# Patient Record
Sex: MALE | Race: WHITE | NOT HISPANIC OR LATINO
[De-identification: names, ages, dates, MRNs, and addresses within clinical notes are randomized per-mention and may not be internally consistent; named-entity substitution may affect disease eponyms.]

---

## 2017-01-03 ENCOUNTER — RX RENEWAL (OUTPATIENT)
Age: 72
End: 2017-01-03

## 2017-03-07 ENCOUNTER — RX RENEWAL (OUTPATIENT)
Age: 72
End: 2017-03-07

## 2017-03-13 ENCOUNTER — RX RENEWAL (OUTPATIENT)
Age: 72
End: 2017-03-13

## 2017-03-20 ENCOUNTER — RX RENEWAL (OUTPATIENT)
Age: 72
End: 2017-03-20

## 2017-03-23 ENCOUNTER — RX RENEWAL (OUTPATIENT)
Age: 72
End: 2017-03-23

## 2017-05-04 ENCOUNTER — RX RENEWAL (OUTPATIENT)
Age: 72
End: 2017-05-04

## 2017-06-18 ENCOUNTER — RX RENEWAL (OUTPATIENT)
Age: 72
End: 2017-06-18

## 2017-06-19 ENCOUNTER — RX RENEWAL (OUTPATIENT)
Age: 72
End: 2017-06-19

## 2017-06-20 ENCOUNTER — RX RENEWAL (OUTPATIENT)
Age: 72
End: 2017-06-20

## 2017-06-21 ENCOUNTER — RX RENEWAL (OUTPATIENT)
Age: 72
End: 2017-06-21

## 2018-01-05 ENCOUNTER — RX RENEWAL (OUTPATIENT)
Age: 73
End: 2018-01-05

## 2020-02-12 ENCOUNTER — APPOINTMENT (OUTPATIENT)
Dept: ENDOCRINOLOGY | Facility: CLINIC | Age: 75
End: 2020-02-12
Payer: MEDICARE

## 2020-02-12 ENCOUNTER — RESULT CHARGE (OUTPATIENT)
Age: 75
End: 2020-02-12

## 2020-02-12 VITALS
HEIGHT: 73 IN | WEIGHT: 214 LBS | SYSTOLIC BLOOD PRESSURE: 156 MMHG | BODY MASS INDEX: 28.36 KG/M2 | DIASTOLIC BLOOD PRESSURE: 74 MMHG | HEART RATE: 74 BPM

## 2020-02-12 LAB — GLUCOSE BLDC GLUCOMTR-MCNC: 289

## 2020-02-12 PROCEDURE — 82962 GLUCOSE BLOOD TEST: CPT

## 2020-02-12 PROCEDURE — 99204 OFFICE O/P NEW MOD 45 MIN: CPT | Mod: 25

## 2020-02-12 RX ORDER — ASPIRIN 81 MG
81 TABLET, DELAYED RELEASE (ENTERIC COATED) ORAL DAILY
Refills: 0 | Status: ACTIVE | COMMUNITY

## 2020-02-12 RX ORDER — HYDROCHLOROTHIAZIDE 12.5 MG/1
12.5 CAPSULE ORAL
Qty: 180 | Refills: 1 | Status: DISCONTINUED | COMMUNITY
Start: 2017-03-13 | End: 2020-02-12

## 2020-02-27 NOTE — PHYSICAL EXAM
[Alert] : alert [No Acute Distress] : no acute distress [Well Nourished] : well nourished [Well Developed] : well developed [Normal Sclera/Conjunctiva] : normal sclera/conjunctiva [EOMI] : extra ocular movement intact [No Proptosis] : no proptosis [No Thyroid Nodules] : there were no palpable thyroid nodules [No Respiratory Distress] : no respiratory distress [Thyroid Not Enlarged] : the thyroid was not enlarged [Normal Rate] : heart rate was normal  [Clear to Auscultation] : lungs were clear to auscultation bilaterally [No Accessory Muscle Use] : no accessory muscle use [Pedal Pulses Normal] : the pedal pulses are present [Normal S1, S2] : normal S1 and S2 [Regular Rhythm] : with a regular rhythm [No Edema] : there was no peripheral edema [Post Cervical Nodes] : posterior cervical nodes [Anterior Cervical Nodes] : anterior cervical nodes [Axillary Nodes] : axillary nodes [Spine Straight] : spine straight [No Spinal Tenderness] : no spinal tenderness [Normal Strength/Tone] : muscle strength and tone were normal [No Stigmata of Cushings Syndrome] : no stigmata of cushings syndrome [Normal Gait] : normal gait [No Rash] : no rash [Full ROM] : with full range of motion [Normal] : normal [Normal Reflexes] : deep tendon reflexes were 2+ and symmetric [No Tremors] : no tremors [Oriented x3] : oriented to person, place, and time [Acanthosis Nigricans] : no acanthosis nigricans [Diminished Throughout Both Feet] : normal tactile sensation with monofilament testing throughout both feet

## 2020-02-27 NOTE — REASON FOR VISIT
[Initial Eval - Existing Diagnosis] : an initial evaluation of an existing diagnosis [FreeTextEntry1] : T2DM SINCE 2005 haD TO SWITCH [facility] [employer]

## 2020-02-27 NOTE — ASSESSMENT
[FreeTextEntry1] : pt is a 73 yo male with long standing h/o T2DM\par  -  T2DM- uncontrolled \par will check BS in AM and 2 hr pp melas- camn vary times\par  diet is not too bad but today  PP  5 slice honey wehat toast  coffee \par can meet wit h RD CDE genral DM ed and reivew diet \par  cotn currnet RX \par never had h/o pnacreatitits  \par can starte activity - no retriciotns from caridology  \par check updated labs\par \par Fatigue- can check hormonal levles \par \par sees ophtalmology q 6 moths \par pod sees every 5 weeks \par iron ferritin

## 2020-02-27 NOTE — HISTORY OF PRESENT ILLNESS
[FreeTextEntry1] : Pt here for eval of T2DM \par since   - if not for meter- pt would l have had no clue had DM  used wife'smeter- BS found to be high \par never met with RD CDE \par \par Current Regimen :\par MFN 1000 mg bid \par jardiance 25 mg qd\par \par \par Meter Uses [place] touch ultra \par \par \par Checks  BS   1-2times per day \par \par \par Hypoglycemia :Never \par \par \par \par Diet  Never met with RD CDE \par \par breakfast WW bread coffee\par L skips \par dinner vegetables rice meat \par no snack no sweets \par  no sweet beverages \par \par \par Exercise- rarely \par \par \par \par Follow ups \par \par opthalmology goes 2x yearly - no retinopathy \par \par Cardiology sees caridology reglualary for HTN and high col \par \par Pulmonary \par \par Nephrology  never seen  no nephropathy \par \par Podiatry \par sees every 5 weeks  callouses ion feet \par \par Intercurrent illness/medical problems\par knee arthritis - had injeciton last week- not steroids \par \par PMH OA \par HTn \par High chol \par  T2DM \par \par \par PSH Hernia repair \par colsocnopy- remove pre-malignant polyp 2016 - did repeat end  - all ok \par \par FH  Father  PPM Mom  in an accident \par sibling and children healthy \par No DM in family \par \par SOc HX  born and raise in [place] \par no chem no XRT \par nonsmoker \par  rare Etoh

## 2020-02-27 NOTE — REVIEW OF SYSTEMS
[Fatigue] : fatigue [Negative] : Heme/Lymph [FreeTextEntry2] : fatigue  pt had low Testosterone in the past  [FreeTextEntry3] : has cataract

## 2020-06-30 LAB
HBA1C MFR BLD HPLC: 8.2
LDLC SERPL DIRECT ASSAY-MCNC: 84

## 2020-07-01 ENCOUNTER — APPOINTMENT (OUTPATIENT)
Dept: ENDOCRINOLOGY | Facility: CLINIC | Age: 75
End: 2020-07-01
Payer: MEDICARE

## 2020-07-01 PROCEDURE — 99441: CPT

## 2020-07-01 RX ORDER — COLD-HOT PACK
125 MCG EACH MISCELLANEOUS DAILY
Refills: 0 | Status: DISCONTINUED | COMMUNITY
End: 2020-07-01

## 2020-07-29 ENCOUNTER — NON-APPOINTMENT (OUTPATIENT)
Age: 75
End: 2020-07-29

## 2020-07-29 NOTE — HISTORY OF PRESENT ILLNESS
[FreeTextEntry1] : fPhone follow up T2DM \par start time 420 \par  end time 430 pm \par \par \par Current Regimen :\par MFN 1000 mg bid \par jardiance 25 mg qd\par \par \par Meter Uses [place] touch ultra \par \par \par Checks  BS   1-2times per day \par \par \par Hypoglycemia :Never \par \par \par \par Diet  Never met with RD CDE \par \par breakfast WW bread coffee\par L skips \par dinner vegetables rice meat \par no snack no sweets \par  no sweet beverages \par eatign more fruits and vegetables lately \par \par Exercise- rarely \par \par \par \par Follow ups \par \par opthalmology goes 2x yearly - no retinopathy \par \par Cardiology sees caridology reglualary for HTN and high col \par \par Pulmonary \par \par Nephrology  never seen  no nephropathy \par \par Podiatry \par sees every 5 weeks  callouses ion feet \par \par Intercurrent illness/medical problems\par  \par PMH OA \par HTn \par High chol \par  T2DM \par \par \par PSH Hernia repair \par colsocnopy- remove pre-malignant polyp 2016 - did repeat end  - all ok \par \par FH  Father  PPM Mom  in an accident \par sibling and children healthy \par No DM in family \par \par SOc HX  born and raise in [place] \par no chem no XRT \par nonsmoker \par  rare Etoh

## 2020-07-29 NOTE — ASSESSMENT
[FreeTextEntry1] : pt is a 75 yo male with long standing h/o T2DM\par  -  T2DM- seems better controlled \par will check BS in AM and 2 hr pp melas- can vary times\par  check updated labs \par \par Fatigue- can check hormonal levles   h/o lowtestosterone \par \par sees ophtalmology q 6 moths \par pod sees every 5 weeks \par  \par \par  pt would like to stay near [place]- will follow up with DR Manjarrez

## 2020-11-03 ENCOUNTER — APPOINTMENT (OUTPATIENT)
Dept: ENDOCRINOLOGY | Facility: CLINIC | Age: 75
End: 2020-11-03

## 2020-11-25 ENCOUNTER — APPOINTMENT (OUTPATIENT)
Dept: ENDOCRINOLOGY | Facility: CLINIC | Age: 75
End: 2020-11-25

## 2021-01-26 ENCOUNTER — RESULT CHARGE (OUTPATIENT)
Age: 76
End: 2021-01-26

## 2021-01-26 ENCOUNTER — APPOINTMENT (OUTPATIENT)
Dept: ENDOCRINOLOGY | Facility: CLINIC | Age: 76
End: 2021-01-26
Payer: MEDICARE

## 2021-01-26 VITALS
OXYGEN SATURATION: 95 % | SYSTOLIC BLOOD PRESSURE: 124 MMHG | BODY MASS INDEX: 29.03 KG/M2 | DIASTOLIC BLOOD PRESSURE: 64 MMHG | HEART RATE: 66 BPM | WEIGHT: 219 LBS | HEIGHT: 73 IN

## 2021-01-26 LAB — GLUCOSE BLDC GLUCOMTR-MCNC: 207

## 2021-01-26 PROCEDURE — 82962 GLUCOSE BLOOD TEST: CPT

## 2021-01-26 PROCEDURE — 99214 OFFICE O/P EST MOD 30 MIN: CPT | Mod: 25

## 2021-01-26 PROCEDURE — 99072 ADDL SUPL MATRL&STAF TM PHE: CPT

## 2021-01-26 RX ORDER — EMPAGLIFLOZIN 25 MG/1
25 TABLET, FILM COATED ORAL
Qty: 90 | Refills: 0 | Status: DISCONTINUED | COMMUNITY
Start: 1900-01-01 | End: 2021-01-26

## 2021-01-26 RX ORDER — ALFUZOSIN HYDROCHLORIDE 10 MG/1
10 TABLET, EXTENDED RELEASE ORAL
Qty: 90 | Refills: 0 | Status: ACTIVE | COMMUNITY
Start: 2020-12-24

## 2021-04-27 ENCOUNTER — APPOINTMENT (OUTPATIENT)
Dept: ENDOCRINOLOGY | Facility: CLINIC | Age: 76
End: 2021-04-27

## 2021-06-01 ENCOUNTER — RESULT CHARGE (OUTPATIENT)
Age: 76
End: 2021-06-01

## 2021-06-01 ENCOUNTER — APPOINTMENT (OUTPATIENT)
Dept: ENDOCRINOLOGY | Facility: CLINIC | Age: 76
End: 2021-06-01
Payer: MEDICARE

## 2021-06-01 VITALS
SYSTOLIC BLOOD PRESSURE: 126 MMHG | TEMPERATURE: 98.1 F | WEIGHT: 210 LBS | BODY MASS INDEX: 27.83 KG/M2 | DIASTOLIC BLOOD PRESSURE: 72 MMHG | OXYGEN SATURATION: 96 % | HEIGHT: 73 IN | HEART RATE: 60 BPM

## 2021-06-01 LAB
GLUCOSE BLDC GLUCOMTR-MCNC: 293
HBA1C MFR BLD HPLC: 8.5
LDLC SERPL DIRECT ASSAY-MCNC: 75
MICROALBUMIN/CREAT 24H UR-RTO: 81

## 2021-06-01 PROCEDURE — 99215 OFFICE O/P EST HI 40 MIN: CPT | Mod: 25

## 2021-06-01 PROCEDURE — 82962 GLUCOSE BLOOD TEST: CPT

## 2021-06-01 RX ORDER — TORSEMIDE 20 MG/1
20 TABLET ORAL
Qty: 30 | Refills: 0 | Status: ACTIVE | COMMUNITY
Start: 2021-05-14

## 2021-06-01 RX ORDER — PREDNISOLONE ACETATE 10 MG/ML
1 SUSPENSION/ DROPS OPHTHALMIC
Qty: 5 | Refills: 0 | Status: ACTIVE | COMMUNITY
Start: 2021-02-08

## 2021-06-02 RX ORDER — TESTOSTERONE CYPIONATE 100 MG/ML
100 INJECTION, SOLUTION INTRAMUSCULAR
Qty: 1 | Refills: 0 | Status: DISCONTINUED | COMMUNITY
Start: 2021-06-01 | End: 2021-06-02

## 2021-06-03 RX ORDER — FLUTICASONE PROPIONATE 0.5 MG/G
0.05 CREAM TOPICAL
Qty: 30 | Refills: 0 | Status: DISCONTINUED | COMMUNITY
Start: 2020-12-23 | End: 2021-06-03

## 2021-06-03 RX ORDER — TOBRAMYCIN AND DEXAMETHASONE 3; 1 MG/ML; MG/ML
0.3-0.1 SUSPENSION/ DROPS OPHTHALMIC
Qty: 5 | Refills: 0 | Status: DISCONTINUED | COMMUNITY
Start: 2020-12-21 | End: 2021-06-03

## 2021-06-03 RX ORDER — DULOXETINE HYDROCHLORIDE 60 MG/1
60 CAPSULE, DELAYED RELEASE PELLETS ORAL
Qty: 90 | Refills: 0 | Status: DISCONTINUED | COMMUNITY
Start: 2020-12-29 | End: 2021-06-03

## 2021-06-03 RX ORDER — CHOLECALCIFEROL (VITAMIN D3) 25 MCG
2500 TABLET,CHEWABLE ORAL
Refills: 0 | Status: ACTIVE | COMMUNITY

## 2021-06-03 RX ORDER — POTASSIUM CHLORIDE 1500 MG/1
20 TABLET, FILM COATED, EXTENDED RELEASE ORAL
Refills: 0 | Status: ACTIVE | COMMUNITY

## 2021-08-31 ENCOUNTER — APPOINTMENT (OUTPATIENT)
Dept: ENDOCRINOLOGY | Facility: CLINIC | Age: 76
End: 2021-08-31
Payer: MEDICARE

## 2021-08-31 ENCOUNTER — RESULT CHARGE (OUTPATIENT)
Age: 76
End: 2021-08-31

## 2021-08-31 VITALS
OXYGEN SATURATION: 93 % | BODY MASS INDEX: 30.75 KG/M2 | TEMPERATURE: 98.2 F | HEART RATE: 62 BPM | SYSTOLIC BLOOD PRESSURE: 114 MMHG | HEIGHT: 73 IN | DIASTOLIC BLOOD PRESSURE: 70 MMHG | WEIGHT: 232 LBS

## 2021-08-31 LAB — GLUCOSE BLDC GLUCOMTR-MCNC: 232

## 2021-08-31 PROCEDURE — 99215 OFFICE O/P EST HI 40 MIN: CPT | Mod: 25

## 2021-08-31 PROCEDURE — 82962 GLUCOSE BLOOD TEST: CPT

## 2021-08-31 RX ORDER — POTASSIUM CHLORIDE 1500 MG/1
20 TABLET, FILM COATED, EXTENDED RELEASE ORAL
Qty: 90 | Refills: 0 | Status: DISCONTINUED | COMMUNITY
Start: 2021-04-23 | End: 2021-08-31

## 2021-08-31 RX ORDER — TESTOSTERONE 4 MG/D
4 PATCH TRANSDERMAL DAILY
Qty: 30 | Refills: 0 | Status: DISCONTINUED | COMMUNITY
Start: 2021-06-02 | End: 2021-08-31

## 2021-08-31 RX ORDER — NEOMYCIN SULFATE, POLYMYXIN B SULFATE AND DEXAMETHASONE 3.5; 10000; 1 MG/ML; [USP'U]/ML; MG/ML
3.5-10000-0.1 SUSPENSION OPHTHALMIC
Qty: 5 | Refills: 0 | Status: DISCONTINUED | COMMUNITY
Start: 2021-02-10 | End: 2021-08-31

## 2021-09-07 ENCOUNTER — RX RENEWAL (OUTPATIENT)
Age: 76
End: 2021-09-07

## 2021-10-12 LAB
HBA1C MFR BLD HPLC: 8.1
LDLC SERPL DIRECT ASSAY-MCNC: 80
MICROALBUMIN/CREAT 24H UR-RTO: 67

## 2021-10-13 ENCOUNTER — APPOINTMENT (OUTPATIENT)
Dept: ENDOCRINOLOGY | Facility: CLINIC | Age: 76
End: 2021-10-13
Payer: MEDICARE

## 2021-10-13 ENCOUNTER — RESULT CHARGE (OUTPATIENT)
Age: 76
End: 2021-10-13

## 2021-10-13 VITALS
DIASTOLIC BLOOD PRESSURE: 70 MMHG | HEIGHT: 73 IN | TEMPERATURE: 98.2 F | OXYGEN SATURATION: 96 % | WEIGHT: 230 LBS | BODY MASS INDEX: 30.48 KG/M2 | SYSTOLIC BLOOD PRESSURE: 120 MMHG | HEART RATE: 63 BPM

## 2021-10-13 LAB — GLUCOSE BLDC GLUCOMTR-MCNC: 337

## 2021-10-13 PROCEDURE — 99215 OFFICE O/P EST HI 40 MIN: CPT | Mod: 25

## 2021-10-13 PROCEDURE — 82962 GLUCOSE BLOOD TEST: CPT

## 2021-10-13 RX ORDER — NICOTINE 21 MG/24HR
21 PATCH, TRANSDERMAL 24 HOURS TRANSDERMAL
Qty: 28 | Refills: 0 | Status: DISCONTINUED | COMMUNITY
Start: 2021-05-19 | End: 2021-10-13

## 2021-10-13 RX ORDER — TAMSULOSIN HYDROCHLORIDE 0.4 MG/1
0.4 CAPSULE ORAL
Qty: 30 | Refills: 0 | Status: DISCONTINUED | COMMUNITY
Start: 2021-04-21 | End: 2021-10-13

## 2021-10-13 RX ORDER — DULAGLUTIDE 0.75 MG/.5ML
0.75 INJECTION, SOLUTION SUBCUTANEOUS
Qty: 6 | Refills: 0 | Status: DISCONTINUED | COMMUNITY
Start: 2021-01-26 | End: 2021-10-13

## 2021-10-25 ENCOUNTER — RX RENEWAL (OUTPATIENT)
Age: 76
End: 2021-10-25

## 2021-10-26 ENCOUNTER — RX RENEWAL (OUTPATIENT)
Age: 76
End: 2021-10-26

## 2021-11-03 ENCOUNTER — APPOINTMENT (OUTPATIENT)
Dept: ENDOCRINOLOGY | Facility: CLINIC | Age: 76
End: 2021-11-03

## 2021-11-14 ENCOUNTER — NON-APPOINTMENT (OUTPATIENT)
Age: 76
End: 2021-11-14

## 2021-11-19 ENCOUNTER — APPOINTMENT (OUTPATIENT)
Dept: ENDOCRINOLOGY | Facility: CLINIC | Age: 76
End: 2021-11-19
Payer: MEDICARE

## 2021-11-19 PROCEDURE — G0108 DIAB MANAGE TRN  PER INDIV: CPT

## 2021-12-02 ENCOUNTER — NON-APPOINTMENT (OUTPATIENT)
Age: 76
End: 2021-12-02

## 2022-01-09 DIAGNOSIS — I50.9 HEART FAILURE, UNSPECIFIED: ICD-10-CM

## 2022-01-12 ENCOUNTER — APPOINTMENT (OUTPATIENT)
Dept: ENDOCRINOLOGY | Facility: CLINIC | Age: 77
End: 2022-01-12
Payer: MEDICARE

## 2022-01-12 ENCOUNTER — RESULT CHARGE (OUTPATIENT)
Age: 77
End: 2022-01-12

## 2022-01-12 VITALS
SYSTOLIC BLOOD PRESSURE: 138 MMHG | WEIGHT: 227 LBS | DIASTOLIC BLOOD PRESSURE: 80 MMHG | HEART RATE: 81 BPM | HEIGHT: 73 IN | BODY MASS INDEX: 30.09 KG/M2

## 2022-01-12 LAB — GLUCOSE BLDC GLUCOMTR-MCNC: 198

## 2022-01-12 PROCEDURE — 99214 OFFICE O/P EST MOD 30 MIN: CPT | Mod: 25

## 2022-01-12 PROCEDURE — 82962 GLUCOSE BLOOD TEST: CPT

## 2022-01-19 ENCOUNTER — NON-APPOINTMENT (OUTPATIENT)
Age: 77
End: 2022-01-19

## 2022-01-20 PROBLEM — I50.9 CHF (CONGESTIVE HEART FAILURE): Status: ACTIVE | Noted: 2022-01-20

## 2022-03-18 ENCOUNTER — NON-APPOINTMENT (OUTPATIENT)
Age: 77
End: 2022-03-18

## 2022-04-13 ENCOUNTER — RESULT CHARGE (OUTPATIENT)
Age: 77
End: 2022-04-13

## 2022-04-13 ENCOUNTER — APPOINTMENT (OUTPATIENT)
Dept: ENDOCRINOLOGY | Facility: CLINIC | Age: 77
End: 2022-04-13
Payer: MEDICARE

## 2022-04-13 VITALS
BODY MASS INDEX: 31.28 KG/M2 | DIASTOLIC BLOOD PRESSURE: 70 MMHG | HEART RATE: 87 BPM | HEIGHT: 73 IN | WEIGHT: 236 LBS | SYSTOLIC BLOOD PRESSURE: 112 MMHG

## 2022-04-13 LAB — GLUCOSE BLDC GLUCOMTR-MCNC: 134

## 2022-04-13 PROCEDURE — 82962 GLUCOSE BLOOD TEST: CPT

## 2022-04-13 PROCEDURE — 99215 OFFICE O/P EST HI 40 MIN: CPT | Mod: 25

## 2022-04-13 RX ORDER — METFORMIN HYDROCHLORIDE 500 MG/1
500 TABLET, FILM COATED, EXTENDED RELEASE ORAL
Qty: 360 | Refills: 1 | Status: ACTIVE | COMMUNITY

## 2022-04-13 RX ORDER — BUPROPION HYDROCHLORIDE 150 MG/1
150 TABLET, EXTENDED RELEASE ORAL DAILY
Qty: 90 | Refills: 0 | Status: ACTIVE | COMMUNITY

## 2022-04-18 RX ORDER — SYRINGE WITH NEEDLE, 1 ML 25GX5/8"
21G X 1" SYRINGE, EMPTY DISPOSABLE MISCELLANEOUS
Qty: 1 | Refills: 1 | Status: DISCONTINUED | COMMUNITY
Start: 2022-04-13 | End: 2022-04-18

## 2022-04-18 RX ORDER — NEEDLES, SAFETY 22GX1 1/2"
23G X 1" NEEDLE, DISPOSABLE MISCELLANEOUS
Qty: 12 | Refills: 0 | Status: DISCONTINUED | COMMUNITY
Start: 2022-04-13 | End: 2022-04-18

## 2022-04-18 RX ORDER — TESTOSTERONE CYPIONATE 200 MG/ML
200 INJECTION, SOLUTION INTRAMUSCULAR
Qty: 6 | Refills: 0 | Status: DISCONTINUED | COMMUNITY
Start: 2022-04-13 | End: 2022-04-18

## 2022-04-21 ENCOUNTER — NON-APPOINTMENT (OUTPATIENT)
Age: 77
End: 2022-04-21

## 2022-05-13 ENCOUNTER — APPOINTMENT (OUTPATIENT)
Dept: ENDOCRINOLOGY | Facility: CLINIC | Age: 77
End: 2022-05-13

## 2022-05-24 ENCOUNTER — APPOINTMENT (OUTPATIENT)
Dept: ORTHOPEDIC SURGERY | Facility: CLINIC | Age: 77
End: 2022-05-24
Payer: MEDICARE

## 2022-05-24 VITALS — HEIGHT: 73 IN | BODY MASS INDEX: 31.28 KG/M2 | WEIGHT: 236 LBS

## 2022-05-24 DIAGNOSIS — M79.646 PAIN IN UNSPECIFIED FINGER(S): ICD-10-CM

## 2022-05-24 PROCEDURE — 20610 DRAIN/INJ JOINT/BURSA W/O US: CPT

## 2022-05-24 PROCEDURE — 99214 OFFICE O/P EST MOD 30 MIN: CPT | Mod: 25

## 2022-05-24 PROCEDURE — 20600 DRAIN/INJ JOINT/BURSA W/O US: CPT

## 2022-05-24 NOTE — HISTORY OF PRESENT ILLNESS
[de-identified] : 76M presents with left shoulder pain with overhead activity and right thumb MP pain with no clicking or locking, No numbness/tingling. No trauma.\par \par 5/24/22: f/u left shoulder, right thumb pain. Reports CSI helped for only about 1-2 months. Denies any numbness/tingling. Would like another CSI today.

## 2022-05-24 NOTE — IMAGING
[de-identified] : Left shoulder with no swelling nor erythema. FF to 110 and abduction to 110 painful at midarc. ER to 35, IR to lower lumbar. Able to make fist, oppose thumb to small finger and abduct fingers. +impingement. Sensation intact throughout. <2sec cap refill.\par \par Left shoulder radiographs with no fracture nor dislocation. Minimal arthrosis. \par \par \par Right Hand: Inspection of the hand/wrist is as follows: Right wrist with no swelling nor erythema. +ttp at thumb MP, no catching with flexion/extension. Able to make fist, oppose thumb to small finger and abduct fingers. Sensation intact throughout. <2sec cap refill.\par \par Right wrist radiographs with thumb MP arthritis/sesamoid arthritis, no fracture nor dislocation.

## 2022-05-24 NOTE — ASSESSMENT
[FreeTextEntry1] : Left shoulder subacromial bursitis - reviewed radiographs and pathoanatpomy with patient. Discussed management will include NSAIDs prn, CSI and PT. After discussion of risks/benefits to CSI, patient elected to proceed.\par ***Procedure 1 - 5.0cc 1%lidocaine + 1.0cc 40mg/cc Kenalog administered to left subacromial space following sterilization with Betadine. Patient tolerated this well.\par \par Right thumb MP arthritis - reviewed radiographs and pathoanatpomy with patient. Discussed management will include NSAIDs prn, bracing, CSI and OT. After discussion of risks/benefits to CSI, patient elected to proceed.\par ***Procedure 1 - 0.5cc 1%lidocaine + 0.5cc 40mg/cc Kenalog administered to right thumb MP/sesamoid following sterilization with Betadine. Patient tolerated this well.\par \par F/u prn

## 2022-07-19 ENCOUNTER — APPOINTMENT (OUTPATIENT)
Dept: ENDOCRINOLOGY | Facility: CLINIC | Age: 77
End: 2022-07-19

## 2022-07-19 ENCOUNTER — RESULT CHARGE (OUTPATIENT)
Age: 77
End: 2022-07-19

## 2022-07-19 VITALS
BODY MASS INDEX: 30.62 KG/M2 | HEIGHT: 73 IN | WEIGHT: 231 LBS | DIASTOLIC BLOOD PRESSURE: 74 MMHG | SYSTOLIC BLOOD PRESSURE: 142 MMHG | OXYGEN SATURATION: 96 % | HEART RATE: 65 BPM

## 2022-07-19 LAB
GLUCOSE BLDC GLUCOMTR-MCNC: 220
HBA1C MFR BLD HPLC: 6.6
LDLC SERPL DIRECT ASSAY-MCNC: 76
MICROALBUMIN/CREAT 24H UR-RTO: 248

## 2022-07-19 PROCEDURE — 99214 OFFICE O/P EST MOD 30 MIN: CPT | Mod: 25

## 2022-07-19 PROCEDURE — 82962 GLUCOSE BLOOD TEST: CPT

## 2022-07-19 RX ORDER — METFORMIN ER 500 MG 500 MG/1
500 TABLET ORAL
Qty: 360 | Refills: 0 | Status: ACTIVE | COMMUNITY
Start: 2022-03-17

## 2022-07-19 RX ORDER — CARVEDILOL 25 MG/1
25 TABLET, FILM COATED ORAL
Qty: 90 | Refills: 0 | Status: DISCONTINUED | COMMUNITY
Start: 2021-05-16 | End: 2022-07-19

## 2022-07-19 RX ORDER — TESTOSTERONE 4 MG/D
4 PATCH TRANSDERMAL
Qty: 3 | Refills: 0 | Status: DISCONTINUED | COMMUNITY
Start: 2022-04-18 | End: 2022-07-19

## 2022-07-19 RX ORDER — CARVEDILOL 6.25 MG/1
6.25 TABLET, FILM COATED ORAL
Qty: 180 | Refills: 0 | Status: ACTIVE | COMMUNITY
Start: 2022-05-15

## 2022-07-19 RX ORDER — TADALAFIL 20 MG/1
20 TABLET ORAL
Qty: 18 | Refills: 0 | Status: ACTIVE | COMMUNITY
Start: 2021-09-20

## 2022-07-19 RX ORDER — CLOTRIMAZOLE AND BETAMETHASONE DIPROPIONATE 10; .5 MG/G; MG/G
1-0.05 CREAM TOPICAL
Qty: 45 | Refills: 0 | Status: COMPLETED | COMMUNITY
Start: 2022-01-19

## 2023-02-02 ENCOUNTER — APPOINTMENT (OUTPATIENT)
Dept: ENDOCRINOLOGY | Facility: CLINIC | Age: 78
End: 2023-02-02
Payer: MEDICARE

## 2023-02-02 VITALS
SYSTOLIC BLOOD PRESSURE: 144 MMHG | BODY MASS INDEX: 30.88 KG/M2 | OXYGEN SATURATION: 69 % | HEIGHT: 73 IN | DIASTOLIC BLOOD PRESSURE: 76 MMHG | WEIGHT: 233 LBS | HEART RATE: 95 BPM

## 2023-02-02 LAB — GLUCOSE BLDC GLUCOMTR-MCNC: 251

## 2023-02-02 PROCEDURE — 99214 OFFICE O/P EST MOD 30 MIN: CPT | Mod: 25

## 2023-02-02 PROCEDURE — 82962 GLUCOSE BLOOD TEST: CPT

## 2023-02-02 RX ORDER — BLOOD-GLUCOSE METER
W/DEVICE EACH MISCELLANEOUS
Qty: 1 | Refills: 0 | Status: DISCONTINUED | COMMUNITY
Start: 2021-10-14 | End: 2023-02-02

## 2023-02-02 RX ORDER — LANCETS
EACH MISCELLANEOUS
Qty: 2 | Refills: 1 | Status: DISCONTINUED | COMMUNITY
Start: 2021-11-19 | End: 2023-02-02

## 2023-02-02 RX ORDER — LANCETS
EACH MISCELLANEOUS
Qty: 2 | Refills: 0 | Status: ACTIVE | COMMUNITY
Start: 2023-02-02 | End: 1900-01-01

## 2023-02-02 RX ORDER — BLOOD SUGAR DIAGNOSTIC
STRIP MISCELLANEOUS
Qty: 100 | Refills: 1 | Status: ACTIVE | COMMUNITY
Start: 2023-02-02 | End: 1900-01-01

## 2023-02-02 RX ORDER — BLOOD SUGAR DIAGNOSTIC
STRIP MISCELLANEOUS
Qty: 2 | Refills: 1 | Status: DISCONTINUED | COMMUNITY
Start: 2021-10-13 | End: 2023-02-02

## 2023-02-02 RX ORDER — BLOOD-GLUCOSE METER
W/DEVICE EACH MISCELLANEOUS
Qty: 1 | Refills: 0 | Status: ACTIVE | COMMUNITY
Start: 2023-02-02 | End: 1900-01-01

## 2023-02-02 RX ORDER — LANCETS
EACH MISCELLANEOUS
Qty: 200 | Refills: 1 | Status: DISCONTINUED | COMMUNITY
Start: 2021-10-13 | End: 2023-02-02

## 2023-02-02 RX ORDER — BLOOD SUGAR DIAGNOSTIC
STRIP MISCELLANEOUS
Qty: 200 | Refills: 1 | Status: ACTIVE | COMMUNITY
Start: 2023-02-02 | End: 1900-01-01

## 2023-02-02 NOTE — REASON FOR VISIT
[Follow - Up] : a follow-up visit [DM Type 2] : DM Type 2 [Hypogonadism] : hypogonadism [Other: _____] : [unfilled]

## 2023-02-02 NOTE — ASSESSMENT
[Diabetes Foot Care] : diabetes foot care [Importance of Diet and Exercise] : importance of diet and exercise to improve glycemic control, achieve weight loss and improve cardiovascular health [Exercise/Effect on Glucose] : exercise/effect on glucose [Hypoglycemia Management] : hypoglycemia management [Self Monitoring of Blood Glucose] : self monitoring of blood glucose [Retinopathy Screening] : Patient was referred to ophthalmology for retinopathy screening [FreeTextEntry1] : 76M w/ T2DM cb chf, HLD, hypogonadism and HTN here for follow up.\par rtc in3 months with labs\par Awaiting results to be sent from PCP\par \par Fatigue, Hypogonadism- fatigued improved. testosterone improved on clomid. continue clomid 50mg every other day. at some point can consider dc.\par \par T2DM cb chf- improving. goal A1c<7 based on age. Likely due to dietary indiscretion and nature of disease. Check sugars at least 2x daily. \par Education provided on diet. Adding protein to bread. Stop eating just simple carbs by themselves\par up to date with eye dotor\par continue metformin ER 500mg 4 tabs daily (has had CHF since 2021, not decompensated)\par continue ozempic 0.25 weekly, cannot increase dose due to expense (lasts about 2 months), can consider patient assistance\par Increase lantus 26 units bedtime\par off jardiance bc too expensive\par off trulicity bc too expensive\par sees podiatrist regularly\par has systolic CHF w/ EF 35%, cannot add actos\par \par HLD- goal ldl<70, dm, chf and hld. on statin. seeing cardiologist, [place] next week \par \par HTN- BP acceptable. continue ARB\par . \par \par Pt to stop MFN 3 days before general anesthesia and inject 12 units of lantus the night before \par Will call patient when receive labs from PCP\par \par RTO in 3 months with Dr. MENDES\par

## 2023-02-02 NOTE — PHYSICAL EXAM
[Alert] : alert [Normal Sclera/Conjunctiva] : normal sclera/conjunctiva [Normal Hearing] : hearing was normal [No Neck Mass] : no neck mass was observed [Thyroid Not Enlarged] : the thyroid was not enlarged [No Respiratory Distress] : no respiratory distress [No Accessory Muscle Use] : no accessory muscle use [Clear to Auscultation] : lungs were clear to auscultation bilaterally [Normal S1, S2] : normal S1 and S2 [No Stigmata of Cushings Syndrome] : no stigmata of Cushings Syndrome [Normal Gait] : normal gait [Oriented x3] : oriented to person, place, and time

## 2023-02-02 NOTE — HISTORY OF PRESENT ILLNESS
[FreeTextEntry1] : goes by "Bill"\par here for t2dm cb chf, htn, hld\par \par interval history:\par awaiting for PCP to fax over recent labs. As per patient HgbA1C 7.5%\par has good energy, feels good\par here with wife, madina (sees Dr. Juarez)\par \par regimen:\par metformin ER 1000mg BID\par ozempic 0.25mg weekly- no issues, Wednesday nights \par lantus 24 units QHS\par off trulicity bc too expensive\par off jardiance bc too expensive\par \par FS in office: 251-> coffee and bread for breakfast \par FS checks daily, fasting \par FS ranges this past week has been more elevated 150-160, prior was 120-130s\par \par \par diet: diet has not been the greatest\par B-> whole wheat bread with I cant believe butter, 2 cups pof coffee with sweet n low \par L-> left overs\par D-> protein, salad, veggie, rice, pasta (Lower crab)\par Snack--> midnight snack sausage, cold cuts, pickles, mc donalds cheese burger, campbells chicken noodle soup \par Drink-> 4 cups of coffee, seltzer  \par exercise: joined [employer]\par \par eye doctor: 2022, [facility], goes once a year \par has systolic CHF. no hospitalizations since last visit \par sees podiatry for calluses on feet, has appointment today at 3PM\par \par On 2/16/23- having urolift to hold prostate open for better urine flow, will be done out patient in office under general anesthesia

## 2023-02-10 ENCOUNTER — NON-APPOINTMENT (OUTPATIENT)
Age: 78
End: 2023-02-10

## 2023-03-14 ENCOUNTER — APPOINTMENT (OUTPATIENT)
Dept: ORTHOPEDIC SURGERY | Facility: CLINIC | Age: 78
End: 2023-03-14
Payer: MEDICARE

## 2023-03-14 DIAGNOSIS — M65.30 TRIGGER FINGER, UNSPECIFIED FINGER: ICD-10-CM

## 2023-03-14 PROCEDURE — 20610 DRAIN/INJ JOINT/BURSA W/O US: CPT | Mod: LT

## 2023-03-14 PROCEDURE — 99214 OFFICE O/P EST MOD 30 MIN: CPT | Mod: 25

## 2023-03-14 PROCEDURE — 20550 NJX 1 TENDON SHEATH/LIGAMENT: CPT | Mod: RT,59

## 2023-03-14 NOTE — HISTORY OF PRESENT ILLNESS
[de-identified] : 76M presents with left shoulder pain with overhead activity and right thumb MP pain with no clicking or locking, No numbness/tingling. No trauma.\par \par 5/24/22: f/u left shoulder, right thumb pain. Reports CSI helped for only about 1-2 months. Denies any numbness/tingling. Would like another CSI today. \par \par 3/14/23: f/u left shoulder and right hand. Reports shoulder is giving off a lot of pain, difficulty sleeping and certain ROM. Denies numbness/tingling. He reports right hand index finer was triggering not long ago, but currently stopped. Would like to proceed with another CSI.

## 2023-03-14 NOTE — IMAGING
[de-identified] : Left shoulder with no swelling nor erythema. FF to 110 and abduction to 110 painful at midarc. ER to 35, IR to lower lumbar. Able to make fist, oppose thumb to small finger and abduct fingers. +impingement. Sensation intact throughout. <2sec cap refill.\par \par Left shoulder radiographs with no fracture nor dislocation. Minimal arthrosis. \par \par \par Right Hand: Inspection of the hand/wrist is as follows: Right wrist with no swelling nor erythema. +ttp at thumb MP, no catching with flexion/extension. Able to make fist, oppose thumb to small finger and abduct fingers. Sensation intact throughout. <2sec cap refill. +ttp at index finger A1 pulley with catching/locking.\par \par Right wrist radiographs with thumb MP arthritis/sesamoid arthritis, no fracture nor dislocation.

## 2023-03-14 NOTE — ASSESSMENT
[FreeTextEntry1] : Left shoulder subacromial bursitis - reviewed radiographs and pathoanatomy with patient. Discussed management will include NSAIDs prn, CSI and PT. After discussion of risks/benefits to CSI, patient elected to proceed.\par ***Procedure 1 - 5.0cc 1%lidocaine + 1.0cc 40mg/cc Kenalog administered to left subacromial space following sterilization with Betadine. Patient tolerated this well.\par \par Right thumb MP arthritis - reviewed radiographs and pathoanatomy with patient. Discussed management will include NSAIDs prn, bracing, CSI and OT. Previously injected, helped.\par \par Right index finger trigger - Discussed with patient the pathoanatomy of trigger and options going forward. Risks/benefits discussed as well as natural progression that injection will provide some relief but symptoms may recur. Discussed that pain may worsen in coming days but will subside. Discussed that we can repeat an injection or that operative intervention may be indicated down the line.\par ***Procedure 2 - 0.5cc 1%lidocaine + 0.5cc 40mg/cc Kenalog administered to right index finger A1 pulley following sterilization with Betadine. Patient tolerated this well.\par \par F/u prn

## 2023-05-24 RX ORDER — PEN NEEDLE, DIABETIC 29 G X1/2"
32G X 4 MM NEEDLE, DISPOSABLE MISCELLANEOUS
Qty: 1 | Refills: 0 | Status: ACTIVE | COMMUNITY
Start: 2021-11-19 | End: 1900-01-01

## 2023-06-06 ENCOUNTER — APPOINTMENT (OUTPATIENT)
Dept: ENDOCRINOLOGY | Facility: CLINIC | Age: 78
End: 2023-06-06

## 2023-07-12 ENCOUNTER — RESULT CHARGE (OUTPATIENT)
Age: 78
End: 2023-07-12

## 2023-07-13 ENCOUNTER — APPOINTMENT (OUTPATIENT)
Dept: ENDOCRINOLOGY | Facility: CLINIC | Age: 78
End: 2023-07-13
Payer: MEDICARE

## 2023-07-13 VITALS
HEART RATE: 64 BPM | OXYGEN SATURATION: 98 % | HEIGHT: 73 IN | DIASTOLIC BLOOD PRESSURE: 75 MMHG | BODY MASS INDEX: 29.95 KG/M2 | SYSTOLIC BLOOD PRESSURE: 140 MMHG | WEIGHT: 226 LBS

## 2023-07-13 LAB — GLUCOSE BLDC GLUCOMTR-MCNC: 203

## 2023-07-13 PROCEDURE — 82962 GLUCOSE BLOOD TEST: CPT

## 2023-07-13 PROCEDURE — 99214 OFFICE O/P EST MOD 30 MIN: CPT | Mod: 25

## 2023-07-13 NOTE — REASON FOR VISIT
[Follow - Up] : a follow-up visit [DM Type 2] : DM Type 2 [Hypogonadism] : hypogonadism [Spouse] : spouse

## 2023-07-13 NOTE — HISTORY OF PRESENT ILLNESS
[FreeTextEntry1] : goes by "Bill"\par here for t2dm cb chf, htn, hld\par \par interval history:\par awaiting for PCP to fax over recent labs. As per patient HgbA1C 7.5%\par has good energy, feels good\par here with wife, madina (sees Dr. Juarez)\par \par regimen:\par metformin ER 1000mg BID\par ozempic 0.25mg weekly- no issues, Wednesday nights \par lantus 26 units QHS\par off trulicity bc too expensive\par off jardiance bc too expensive\par \par FS in office:  203-->> ate around 12:30, 3 pieces of toast and coffee \par FS checks daily, fasting 120-150\par \par \par diet: diet has not been the greatest\par B-> whole wheat bread with I cant believe butter, 2 cups pof coffee with sweet n low \par L-> left overs\par D-> protein, salad, veggie, rice, pasta (Lower crab)\par Snack--> midnight snack sausage, cold cuts, pickles, mc donalds cheese burger, campbells chicken noodle soup \par Drink-> 4 cups of coffee, seltzer  \par exercise: joined [employer]\par \par eye doctor: 10/2022, [facility], goes once a year \par has systolic CHF. no hospitalizations since last visit \par sees podiatry for calluses on feet, every 5 weeks \par \par Urolift--> surgery went well, healing, fine went from urinating 6 times a night to 2 times  \par \par Pt c/o extreme fatigue, he stopped clomid for 6 weeks in May, did not feel any difference, restarted again 6/14/23.\par He also c/o red spots on arms (looks like small brusising) was told to f/u with PCP and cardiologist.

## 2023-07-13 NOTE — REVIEW OF SYSTEMS
[Fatigue] : fatigue [Recent Weight Loss (___ Lbs)] : recent weight loss: [unfilled] lbs [Visual Field Defect] : visual field defect [Blurred Vision] : no blurred vision [Dysphagia] : no dysphagia [Chest Pain] : no chest pain [Palpitations] : no palpitations [Shortness Of Breath] : no shortness of breath [Constipation] : no constipation [Vomiting] : no vomiting [Diarrhea] : no diarrhea [Polyuria] : no polyuria [Polydipsia] : no polydipsia

## 2023-07-13 NOTE — ASSESSMENT
[Diabetes Foot Care] : diabetes foot care [Long Term Vascular Complications] : long term vascular complications of diabetes [Carbohydrate Consistent Diet] : carbohydrate consistent diet [Exercise/Effect on Glucose] : exercise/effect on glucose [Retinopathy Screening] : Patient was referred to ophthalmology for retinopathy screening [FreeTextEntry1] : 77 M w/ T2DM cb chf, HLD, hypogonadism and HTN here for follow up.\par rtc in3 months with labs\par Awaiting results to be sent from PCP\par \par Fatigue, Hypogonadism- fatigued worse testosterone improved on clomid. continue clomid 50mg every other day. at some point can consider dc.awaiting testosterone levels \par \par T2DM cb chf- improving. goal A1c<7 based on age. Likely due to dietary indiscretion and nature of disease. Check sugars at least 2x daily. \par Education provided on diet. Adding protein to bread. Stop eating just simple carbs by themselves\par up to date with eye dotor\par continue metformin ER 500mg 4 tabs daily (has had CHF since 2021, not decompensated)\par Patient wants to stop ozempic for 2 weeks to see if constipation is better \par continue lantus 26 units bedtime\par off jardiance bc too expensive\par off trulicity bc too expensive\par sees podiatrist regularly\par has systolic CHF w/ EF 35%, cannot add actos\par \par HLD- goal ldl<70, dm, chf and hld. on statin. seeing cardiologist, [place] next week \par \par HTN- BP acceptable. continue ARB\par \par . \par Needs to see PCP/cardio for brusiing on arms, has cardio appt on Monday \par \par RTO in 3 months with Dr. MENDES\par \par Awaiting lab results \par

## 2023-07-17 ENCOUNTER — NON-APPOINTMENT (OUTPATIENT)
Age: 78
End: 2023-07-17

## 2023-07-18 ENCOUNTER — APPOINTMENT (OUTPATIENT)
Dept: ORTHOPEDIC SURGERY | Facility: CLINIC | Age: 78
End: 2023-07-18
Payer: MEDICARE

## 2023-07-18 DIAGNOSIS — M25.519 PAIN IN UNSPECIFIED SHOULDER: ICD-10-CM

## 2023-07-18 PROCEDURE — 20610 DRAIN/INJ JOINT/BURSA W/O US: CPT | Mod: LT

## 2023-07-18 PROCEDURE — 99214 OFFICE O/P EST MOD 30 MIN: CPT | Mod: 25

## 2023-07-18 PROCEDURE — 73564 X-RAY EXAM KNEE 4 OR MORE: CPT | Mod: LT

## 2023-07-18 NOTE — HISTORY OF PRESENT ILLNESS
[de-identified] : 76M presents with left shoulder pain with overhead activity and right thumb MP pain with no clicking or locking, No numbness/tingling. No trauma.\par \par 5/24/22: f/u left shoulder, right thumb pain. Reports CSI helped for only about 1-2 months. Denies any numbness/tingling. Would like another CSI today. \par \par 3/14/23: f/u left shoulder and right hand. Reports shoulder is giving off a lot of pain, difficulty sleeping and certain ROM. Denies numbness/tingling. He reports right hand index finer was triggering not long ago, but currently stopped. Would like to proceed with another CSI. \par \par ** NEW COMPLAINT**\par \par 7/18/23 Patient presents today with left knee pain over 5+ years, no specific cause of injury/ trauma. Admits to experiencing shooting pain, no prior treatment. Patient reports continues to experience shoulder pain. Denies numbness/ tingling. Denies recent OT/PT treatment.

## 2023-07-18 NOTE — IMAGING
[de-identified] : Left shoulder with no swelling nor erythema. FF to 110 and abduction to 110 painful at midarc. ER to 35, IR to lower lumbar. Able to make fist, oppose thumb to small finger and abduct fingers. +impingement. Sensation intact throughout. <2sec cap refill.\par \par Left shoulder radiographs with no fracture nor dislocation. Minimal arthrosis. \par \par \par Right Hand: Inspection of the hand/wrist is as follows: Right wrist with no swelling nor erythema. +ttp at thumb MP, no catching with flexion/extension. Able to make fist, oppose thumb to small finger and abduct fingers. Sensation intact throughout. <2sec cap refill. +ttp at index finger A1 pulley with catching/locking.\par \par Right wrist radiographs with thumb MP arthritis/sesamoid arthritis, no fracture nor dislocation.  \par \par Left knee with +ttp along biceps insertion. Mi,d effusion. Able to straight leg raise, +TA, EHL, GA. SILT throughout.\par \par Left knee radiographs with advanced medial compartment arthrosis.

## 2023-07-18 NOTE — ASSESSMENT
[FreeTextEntry1] : Left shoulder subacromial bursitis - reviewed radiographs and pathoanatomy with patient. Discussed management will include NSAIDs prn, CSI and PT. After discussion of risks/benefits to CSI, patient elected to proceed.\par ***Procedure 1 - 5.0cc 1%lidocaine + 1.0cc 40mg/cc Kenalog administered to left subacromial space following sterilization with Betadine. Patient tolerated this well.\par \par Right thumb MP arthritis - reviewed radiographs and pathoanatomy with patient. Discussed management will include NSAIDs prn, bracing, CSI and OT. Previously injected, helped.\par \par Right index finger trigger - Discussed with patient the pathoanatomy of trigger and options going forward. Risks/benefits discussed as well as natural progression that injection will provide some relief but symptoms may recur. Discussed that pain may worsen in coming days but will subside. Discussed that we can repeat an injection or that operative intervention may be indicated down the line.\par \par Left knee Biceps insertional tendonitis - reviewed radiographs and pathoanatomy. PT, NSAIDs prn\par \par F/u prn

## 2023-08-29 ENCOUNTER — RX RENEWAL (OUTPATIENT)
Age: 78
End: 2023-08-29

## 2023-09-14 ENCOUNTER — RX RENEWAL (OUTPATIENT)
Age: 78
End: 2023-09-14

## 2023-09-20 ENCOUNTER — RX RENEWAL (OUTPATIENT)
Age: 78
End: 2023-09-20

## 2023-10-18 LAB
HBA1C MFR BLD HPLC: 8.5
LDLC SERPL DIRECT ASSAY-MCNC: 75
MICROALBUMIN/CREAT 24H UR-RTO: 96

## 2023-10-19 ENCOUNTER — APPOINTMENT (OUTPATIENT)
Dept: ENDOCRINOLOGY | Facility: CLINIC | Age: 78
End: 2023-10-19
Payer: MEDICARE

## 2023-10-19 VITALS
HEIGHT: 73 IN | DIASTOLIC BLOOD PRESSURE: 70 MMHG | TEMPERATURE: 98 F | HEART RATE: 76 BPM | WEIGHT: 222 LBS | OXYGEN SATURATION: 98 % | BODY MASS INDEX: 29.42 KG/M2 | SYSTOLIC BLOOD PRESSURE: 135 MMHG

## 2023-10-19 DIAGNOSIS — R53.83 OTHER FATIGUE: ICD-10-CM

## 2023-10-19 LAB — GLUCOSE BLDC GLUCOMTR-MCNC: 425

## 2023-10-19 PROCEDURE — 82962 GLUCOSE BLOOD TEST: CPT

## 2023-10-19 PROCEDURE — 99214 OFFICE O/P EST MOD 30 MIN: CPT | Mod: 25

## 2023-10-30 ENCOUNTER — APPOINTMENT (OUTPATIENT)
Dept: ORTHOPEDIC SURGERY | Facility: CLINIC | Age: 78
End: 2023-10-30

## 2023-11-28 ENCOUNTER — NON-APPOINTMENT (OUTPATIENT)
Age: 78
End: 2023-11-28

## 2023-12-07 ENCOUNTER — APPOINTMENT (OUTPATIENT)
Dept: ENDOCRINOLOGY | Facility: CLINIC | Age: 78
End: 2023-12-07
Payer: MEDICARE

## 2023-12-07 VITALS
WEIGHT: 222 LBS | SYSTOLIC BLOOD PRESSURE: 146 MMHG | OXYGEN SATURATION: 98 % | HEART RATE: 65 BPM | HEIGHT: 73 IN | DIASTOLIC BLOOD PRESSURE: 82 MMHG | BODY MASS INDEX: 29.42 KG/M2

## 2023-12-07 LAB — GLUCOSE BLDC GLUCOMTR-MCNC: 315

## 2023-12-07 PROCEDURE — 82962 GLUCOSE BLOOD TEST: CPT

## 2023-12-07 PROCEDURE — 99214 OFFICE O/P EST MOD 30 MIN: CPT | Mod: 25

## 2023-12-07 RX ORDER — SEMAGLUTIDE 1.34 MG/ML
2 INJECTION, SOLUTION SUBCUTANEOUS
Qty: 3 | Refills: 0 | Status: DISCONTINUED | COMMUNITY
Start: 2021-10-13 | End: 2023-12-07

## 2023-12-07 RX ORDER — ZOLPIDEM TARTRATE 10 MG/1
10 TABLET ORAL
Qty: 30 | Refills: 0 | Status: DISCONTINUED | COMMUNITY
Start: 2021-05-26 | End: 2023-12-07

## 2023-12-19 ENCOUNTER — RX RENEWAL (OUTPATIENT)
Age: 78
End: 2023-12-19

## 2024-01-15 LAB
HBA1C MFR BLD HPLC: 9
LDLC SERPL DIRECT ASSAY-MCNC: 76
MICROALBUMIN/CREAT 24H UR-RTO: 141

## 2024-01-16 ENCOUNTER — APPOINTMENT (OUTPATIENT)
Dept: ENDOCRINOLOGY | Facility: CLINIC | Age: 79
End: 2024-01-16
Payer: MEDICARE

## 2024-01-16 VITALS
SYSTOLIC BLOOD PRESSURE: 144 MMHG | BODY MASS INDEX: 30.48 KG/M2 | HEART RATE: 61 BPM | DIASTOLIC BLOOD PRESSURE: 70 MMHG | HEIGHT: 73 IN | WEIGHT: 230 LBS | OXYGEN SATURATION: 96 %

## 2024-01-16 LAB — GLUCOSE BLDC GLUCOMTR-MCNC: 271

## 2024-01-16 PROCEDURE — 82962 GLUCOSE BLOOD TEST: CPT

## 2024-01-16 PROCEDURE — 99215 OFFICE O/P EST HI 40 MIN: CPT

## 2024-01-16 PROCEDURE — G2211 COMPLEX E/M VISIT ADD ON: CPT

## 2024-01-16 RX ORDER — REPAGLINIDE 1 MG/1
1 TABLET ORAL 3 TIMES DAILY
Qty: 270 | Refills: 1 | Status: ACTIVE | COMMUNITY
Start: 2024-01-16 | End: 1900-01-01

## 2024-01-16 NOTE — HISTORY OF PRESENT ILLNESS
[FreeTextEntry1] : goes by "Bill" here for t2dm cb chf, htn, hld 10/25/23 AS s/p TAVR  interval history last seen by me in 7/2022 has been seen by NPs has good energy, feels good here with wife, madina (sees Dr. Juarez)  chart reviewed extensively  labs reviewed- a1c 9.0, toby 141, testo 301, ldl 76  regimen clomid 50 every other day metformin 1000mg BID ozempic 0.25mg weekly- off for it about 2-3 weeks,  lantus 32 units QHS off trulicity bc too expensive off jardiance bc too expensive  FS in office: 271, 3 slices of bread FS checks daily no logs  diet: eating better exercise: [facility]  [facility] doctor: 2023, neg  has systolic CHF. was admitted to [facility] podiatry for calluses on feet

## 2024-01-16 NOTE — ASSESSMENT
[FreeTextEntry1] : 78M [place]  w/ T2DM cb microalbuminuria/hx of chf, AS s/p TAVR, HLD, hypogonadism and HTN here for follow up. rtc in 3 months with np rtc in 6 months with me [facility], counseled the patient   Fatigue, Hypogonadism- fatigued improved. testosterone improved on clomid. continue clomid 50mg every other day. at some point can consider dc.  T2DM cb chf/microalbuminuria- A1c worsened, probably due to holidays and being off of ozempic. goal A1c 7-8 based on age. Check sugars at least 2x daily. up to date with eye dotor continue metformin ER 500mg 4 tabs daily (2021 EF 55%) continue ozempic 0.25 weekly, cannot increase dose due to expense (lasts about 2 months), given 2 boxes until [facility] can [facility] with [facility], can consider patient assistance at a later date continue lantus 32 units bedtime add prandin 1 mg TID  off jardiance/trulicity bc too expensive sees podiatrist regularly toby abnormal, has microalbuminuria  HLD- goal ldl<70, dm, chf and hld. on statin. seeing cardiologist. would defer  HTN- BP acceptable. continue ARB

## 2024-02-27 ENCOUNTER — RX RENEWAL (OUTPATIENT)
Age: 79
End: 2024-02-27

## 2024-02-27 RX ORDER — PEN NEEDLE, DIABETIC 32GX 5/32"
32G X 4 MM NEEDLE, DISPOSABLE MISCELLANEOUS
Qty: 100 | Refills: 2 | Status: ACTIVE | COMMUNITY
Start: 2023-08-29 | End: 1900-01-01

## 2024-03-27 RX ORDER — INSULIN GLARGINE 100 [IU]/ML
100 INJECTION, SOLUTION SUBCUTANEOUS
Qty: 2 | Refills: 1 | Status: ACTIVE | COMMUNITY
Start: 2021-11-19 | End: 1900-01-01

## 2024-04-02 ENCOUNTER — NON-APPOINTMENT (OUTPATIENT)
Age: 79
End: 2024-04-02

## 2024-04-04 ENCOUNTER — RX RENEWAL (OUTPATIENT)
Age: 79
End: 2024-04-04

## 2024-04-15 ENCOUNTER — APPOINTMENT (OUTPATIENT)
Dept: ENDOCRINOLOGY | Facility: CLINIC | Age: 79
End: 2024-04-15
Payer: MEDICARE

## 2024-04-15 VITALS
SYSTOLIC BLOOD PRESSURE: 160 MMHG | WEIGHT: 237 LBS | BODY MASS INDEX: 31.41 KG/M2 | HEIGHT: 73 IN | HEART RATE: 65 BPM | DIASTOLIC BLOOD PRESSURE: 82 MMHG | OXYGEN SATURATION: 95 %

## 2024-04-15 DIAGNOSIS — E29.1 TESTICULAR HYPOFUNCTION: ICD-10-CM

## 2024-04-15 DIAGNOSIS — E78.5 HYPERLIPIDEMIA, UNSPECIFIED: ICD-10-CM

## 2024-04-15 DIAGNOSIS — E11.21 TYPE 2 DIABETES MELLITUS WITH DIABETIC NEPHROPATHY: ICD-10-CM

## 2024-04-15 DIAGNOSIS — I10 ESSENTIAL (PRIMARY) HYPERTENSION: ICD-10-CM

## 2024-04-15 DIAGNOSIS — E11.65 TYPE 2 DIABETES MELLITUS WITH HYPERGLYCEMIA: ICD-10-CM

## 2024-04-15 LAB — GLUCOSE BLDC GLUCOMTR-MCNC: 270

## 2024-04-15 PROCEDURE — G2211 COMPLEX E/M VISIT ADD ON: CPT

## 2024-04-15 PROCEDURE — 99214 OFFICE O/P EST MOD 30 MIN: CPT

## 2024-04-15 PROCEDURE — 82962 GLUCOSE BLOOD TEST: CPT

## 2024-04-15 RX ORDER — SEMAGLUTIDE 0.68 MG/ML
2 INJECTION, SOLUTION SUBCUTANEOUS
Qty: 3 | Refills: 0 | Status: ACTIVE | COMMUNITY
Start: 2023-09-20 | End: 1900-01-01

## 2024-04-15 NOTE — HISTORY OF PRESENT ILLNESS
[FreeTextEntry1] : Goes by "Bill" Here for T2DM c/b CHF, HTN, HLD History of AS s/p TAVR 2023  INTERVAL HISTORY: Here with wife, Yamilet (sees Dr. Juarez)  Current regimen: Metformin 1000 mg BID Prandin 1 mg TID AC Ozempic 0.25 mg weekly Lantus 26 units QHS  off trulicity bc too expensive off jardiance bc too expensive  Current B, ate 3 slices of whole wheat bread with peanut butter for breakfast 2 hours ago SMBG once daily, fasting in AM. Reviewed BG logs. FBG 90s to 120s. Denies hypoglycemia.  Diet: eating better. Eats salad every day, does not think he is eating any more than he was in the past. Exercise: none Weight: gained 15 pounds in the past 4 months.  Eye doctor: 2024, no DR per patient Feet: sees podiatrist q 5-6 weeks, denies numbness/tingling Kidney disease: none Heart disease: has systolic CHF. s/p TAVR 2023. ---------------------------------------------------------------------------------------------------- Hypogonadism Clomid 50 mg QOD x 2 years, started 2022 for testosterone 267 (low normal), improved 2022 to 438 Still feeling tired, lack of energy Denies weakness Decreased libido and ED- improved initially with Clomid, but now poor again Reports he wants to stop taking Clomid because he is concerned about side effects and he does not think it is really helping.

## 2024-04-15 NOTE — ASSESSMENT
[FreeTextEntry1] : 78 year old male, [place] , presents today for follow-up of T2DM with associated microalbuminuria, also with hypogonadism, hyperlipidemia, and hypertension with h/o AS s/p TAVR October 2023.  1. T2DM- A1c 8.5%, improved from 9.0% in January 2024. Only testing BG once daily, fasting in AM, and readings are 90s to 120s. -Increase Ozempic to 0.5 mg weekly if affordable (will let me know). -Continue Metformin 1000 mg BID and Lantus 26 units HS. -Suggest use of CGM for better assessment of glucose patterns and to help patient make better choices. If does not want CGM, needs to increase frequency of SMBG and test at varying times to get some post meal readings. Consider increase in Prandin. -Stressed importance of lifestyle modifications- diet, exercise, and weight loss- to improve glycemic control. -Needs to add in some exercise, aim for 30 minutes of moderate intensity at least 3-4x per week. Can walk for 10 minutes after each meal, stationary pedal, weight/resistance exercises. -Repeat A1c and RTO for follow-up with MD in 3 months. -Has microalbuminuria, but improving on labs April 2024. -Up to date with eye exam.  2. Hypogonadism/ED/fatigue- wants to stop Clomid because feels it is no longer helping. -Check testosterone level now, then will call to discuss d/c.  3. Hyperlipidemia- Chol 137, LDL 79 -Continue statin. -Repeat lipids in 3 months.   4. Hypertension- BP elevated today, on ARB and CCB. Asymptomatic. -Will consider med adjustment is elevated again at next visit. Follows with cardiology and should defer. ----------------------------------------------------------------  This patient with diabetes: -Injects insulin 1+ times daily. -Is currently on CGM, testing glucose continuously. -Has been seen in the office by a provider within the last six months to review CGM data with their provider.  CGM Is medically necessary for this patient:  -CGM will improve/maintain A1c. -CGM will reduce hypoglycemic events.  Amira and Dexcom each require one test strip daily to use in case of sensor failure or for blood glucose verification or during sensor warm up.

## 2024-05-15 RX ORDER — CLOMIPHENE CITRATE 50 MG/1
50 TABLET ORAL
Qty: 45 | Refills: 1 | Status: DISCONTINUED | COMMUNITY
Start: 2022-04-21 | End: 2024-05-15

## 2024-07-09 RX ORDER — CLOMIPHENE CITRATE 50 MG/1
50 TABLET ORAL DAILY
Qty: 90 | Refills: 0 | Status: ACTIVE | COMMUNITY
Start: 2024-07-09 | End: 1900-01-01

## 2024-07-12 LAB
HBA1C MFR BLD HPLC: 7.9
LDLC SERPL DIRECT ASSAY-MCNC: 59
MICROALBUMIN/CREAT 24H UR-RTO: 58

## 2024-07-15 ENCOUNTER — APPOINTMENT (OUTPATIENT)
Dept: ENDOCRINOLOGY | Facility: CLINIC | Age: 79
End: 2024-07-15
Payer: MEDICARE

## 2024-07-15 VITALS
SYSTOLIC BLOOD PRESSURE: 130 MMHG | DIASTOLIC BLOOD PRESSURE: 70 MMHG | BODY MASS INDEX: 30.48 KG/M2 | RESPIRATION RATE: 95 BRPM | WEIGHT: 230 LBS | HEIGHT: 73 IN | HEART RATE: 59 BPM

## 2024-07-15 DIAGNOSIS — E78.5 HYPERLIPIDEMIA, UNSPECIFIED: ICD-10-CM

## 2024-07-15 DIAGNOSIS — E29.1 TESTICULAR HYPOFUNCTION: ICD-10-CM

## 2024-07-15 DIAGNOSIS — E11.65 TYPE 2 DIABETES MELLITUS WITH HYPERGLYCEMIA: ICD-10-CM

## 2024-07-15 DIAGNOSIS — E11.21 TYPE 2 DIABETES MELLITUS WITH DIABETIC NEPHROPATHY: ICD-10-CM

## 2024-07-15 DIAGNOSIS — I10 ESSENTIAL (PRIMARY) HYPERTENSION: ICD-10-CM

## 2024-07-15 DIAGNOSIS — R53.83 OTHER FATIGUE: ICD-10-CM

## 2024-07-15 LAB — GLUCOSE BLDC GLUCOMTR-MCNC: 270

## 2024-07-15 PROCEDURE — 99215 OFFICE O/P EST HI 40 MIN: CPT

## 2024-07-15 PROCEDURE — 82962 GLUCOSE BLOOD TEST: CPT

## 2024-10-21 ENCOUNTER — APPOINTMENT (OUTPATIENT)
Dept: ENDOCRINOLOGY | Facility: CLINIC | Age: 79
End: 2024-10-21

## 2024-10-31 ENCOUNTER — RX RENEWAL (OUTPATIENT)
Age: 79
End: 2024-10-31

## 2025-01-17 ENCOUNTER — APPOINTMENT (OUTPATIENT)
Dept: ENDOCRINOLOGY | Facility: CLINIC | Age: 80
End: 2025-01-17
Payer: MEDICARE

## 2025-01-17 VITALS
HEART RATE: 54 BPM | SYSTOLIC BLOOD PRESSURE: 126 MMHG | DIASTOLIC BLOOD PRESSURE: 68 MMHG | HEIGHT: 73 IN | WEIGHT: 239 LBS | OXYGEN SATURATION: 94 % | BODY MASS INDEX: 31.68 KG/M2

## 2025-01-17 DIAGNOSIS — E11.21 TYPE 2 DIABETES MELLITUS WITH DIABETIC NEPHROPATHY: ICD-10-CM

## 2025-01-17 DIAGNOSIS — I10 ESSENTIAL (PRIMARY) HYPERTENSION: ICD-10-CM

## 2025-01-17 DIAGNOSIS — E29.1 TESTICULAR HYPOFUNCTION: ICD-10-CM

## 2025-01-17 DIAGNOSIS — R53.83 OTHER FATIGUE: ICD-10-CM

## 2025-01-17 DIAGNOSIS — E78.5 HYPERLIPIDEMIA, UNSPECIFIED: ICD-10-CM

## 2025-01-17 LAB — GLUCOSE BLDC GLUCOMTR-MCNC: 139

## 2025-01-17 PROCEDURE — G2211 COMPLEX E/M VISIT ADD ON: CPT

## 2025-01-17 PROCEDURE — 82962 GLUCOSE BLOOD TEST: CPT

## 2025-01-17 PROCEDURE — 99214 OFFICE O/P EST MOD 30 MIN: CPT

## 2025-01-17 RX ORDER — LISINOPRIL 5 MG/1
5 TABLET ORAL
Qty: 90 | Refills: 3 | Status: ACTIVE | COMMUNITY
Start: 2025-01-17 | End: 1900-01-01

## 2025-01-21 LAB
HBA1C MFR BLD HPLC: 7.4
LDLC SERPL DIRECT ASSAY-MCNC: 84
MICROALBUMIN/CREAT 24H UR-RTO: 199
TSH SERPL-ACNC: 2.73

## 2025-02-01 ENCOUNTER — NON-APPOINTMENT (OUTPATIENT)
Age: 80
End: 2025-02-01

## 2025-02-02 ENCOUNTER — NON-APPOINTMENT (OUTPATIENT)
Age: 80
End: 2025-02-02

## 2025-03-25 ENCOUNTER — APPOINTMENT (OUTPATIENT)
Dept: ORTHOPEDIC SURGERY | Facility: CLINIC | Age: 80
End: 2025-03-25

## 2025-03-25 VITALS — HEIGHT: 73 IN | WEIGHT: 239 LBS | BODY MASS INDEX: 31.68 KG/M2

## 2025-03-25 DIAGNOSIS — Z78.9 OTHER SPECIFIED HEALTH STATUS: ICD-10-CM

## 2025-03-25 DIAGNOSIS — M17.12 UNILATERAL PRIMARY OSTEOARTHRITIS, LEFT KNEE: ICD-10-CM

## 2025-03-25 DIAGNOSIS — M16.11 UNILATERAL PRIMARY OSTEOARTHRITIS, RIGHT HIP: ICD-10-CM

## 2025-03-25 DIAGNOSIS — M25.551 PAIN IN RIGHT HIP: ICD-10-CM

## 2025-03-25 PROCEDURE — 73503 X-RAY EXAM HIP UNI 4/> VIEWS: CPT | Mod: RT

## 2025-03-25 PROCEDURE — 99204 OFFICE O/P NEW MOD 45 MIN: CPT | Mod: 25

## 2025-03-25 PROCEDURE — 20610 DRAIN/INJ JOINT/BURSA W/O US: CPT | Mod: LT

## 2025-04-03 ENCOUNTER — NON-APPOINTMENT (OUTPATIENT)
Age: 80
End: 2025-04-03

## 2025-04-03 ENCOUNTER — APPOINTMENT (OUTPATIENT)
Dept: CARDIOLOGY | Facility: CLINIC | Age: 80
End: 2025-04-03
Payer: MEDICARE

## 2025-04-03 VITALS
BODY MASS INDEX: 31.81 KG/M2 | HEART RATE: 59 BPM | SYSTOLIC BLOOD PRESSURE: 110 MMHG | WEIGHT: 240 LBS | OXYGEN SATURATION: 97 % | DIASTOLIC BLOOD PRESSURE: 62 MMHG | HEIGHT: 73 IN

## 2025-04-03 DIAGNOSIS — Z87.891 PERSONAL HISTORY OF NICOTINE DEPENDENCE: ICD-10-CM

## 2025-04-03 DIAGNOSIS — R63.5 ABNORMAL WEIGHT GAIN: ICD-10-CM

## 2025-04-03 DIAGNOSIS — Z95.2 PRESENCE OF PROSTHETIC HEART VALVE: ICD-10-CM

## 2025-04-03 DIAGNOSIS — R60.0 LOCALIZED EDEMA: ICD-10-CM

## 2025-04-03 DIAGNOSIS — I50.20 UNSPECIFIED SYSTOLIC (CONGESTIVE) HEART FAILURE: ICD-10-CM

## 2025-04-03 DIAGNOSIS — E11.9 TYPE 2 DIABETES MELLITUS W/OUT COMPLICATIONS: ICD-10-CM

## 2025-04-03 PROCEDURE — 99204 OFFICE O/P NEW MOD 45 MIN: CPT | Mod: 25

## 2025-04-03 PROCEDURE — 93000 ELECTROCARDIOGRAM COMPLETE: CPT

## 2025-04-03 RX ORDER — TORSEMIDE 20 MG/1
20 TABLET ORAL TWICE DAILY
Qty: 180 | Refills: 3 | Status: ACTIVE | COMMUNITY
Start: 2025-04-03 | End: 1900-01-01

## 2025-04-07 ENCOUNTER — NON-APPOINTMENT (OUTPATIENT)
Age: 80
End: 2025-04-07

## 2025-04-18 ENCOUNTER — APPOINTMENT (OUTPATIENT)
Dept: ENDOCRINOLOGY | Facility: CLINIC | Age: 80
End: 2025-04-18
Payer: MEDICARE

## 2025-04-18 VITALS
WEIGHT: 238 LBS | HEIGHT: 73 IN | BODY MASS INDEX: 31.54 KG/M2 | SYSTOLIC BLOOD PRESSURE: 126 MMHG | OXYGEN SATURATION: 95 % | HEART RATE: 58 BPM | DIASTOLIC BLOOD PRESSURE: 68 MMHG

## 2025-04-18 DIAGNOSIS — E78.5 HYPERLIPIDEMIA, UNSPECIFIED: ICD-10-CM

## 2025-04-18 DIAGNOSIS — E11.21 TYPE 2 DIABETES MELLITUS WITH DIABETIC NEPHROPATHY: ICD-10-CM

## 2025-04-18 DIAGNOSIS — E29.1 TESTICULAR HYPOFUNCTION: ICD-10-CM

## 2025-04-18 DIAGNOSIS — I10 ESSENTIAL (PRIMARY) HYPERTENSION: ICD-10-CM

## 2025-04-18 DIAGNOSIS — R53.83 OTHER FATIGUE: ICD-10-CM

## 2025-04-18 LAB
GLUCOSE BLDC GLUCOMTR-MCNC: 228
HBA1C MFR BLD HPLC: 7.9
LDLC SERPL DIRECT ASSAY-MCNC: 83
MICROALBUMIN/CREAT 24H UR-RTO: 145
TSH SERPL-ACNC: 2.81

## 2025-04-18 PROCEDURE — 99214 OFFICE O/P EST MOD 30 MIN: CPT

## 2025-04-18 PROCEDURE — 82962 GLUCOSE BLOOD TEST: CPT

## 2025-04-18 PROCEDURE — G2211 COMPLEX E/M VISIT ADD ON: CPT

## 2025-04-18 RX ORDER — DAPAGLIFLOZIN 10 MG/1
10 TABLET, FILM COATED ORAL
Qty: 90 | Refills: 0 | Status: ACTIVE | COMMUNITY
Start: 2025-04-18 | End: 1900-01-01

## 2025-05-08 ENCOUNTER — NON-APPOINTMENT (OUTPATIENT)
Age: 80
End: 2025-05-08

## 2025-05-09 RX ORDER — NEEDLES, DISPOSABLE 25GX5/8"
18G X 1" NEEDLE, DISPOSABLE MISCELLANEOUS
Qty: 6 | Refills: 1 | Status: ACTIVE | COMMUNITY
Start: 2025-05-09 | End: 1900-01-01

## 2025-05-09 RX ORDER — SYRINGE, DISPOSABLE, 1 ML
3 ML SYRINGE, EMPTY DISPOSABLE MISCELLANEOUS
Qty: 6 | Refills: 0 | Status: ACTIVE | COMMUNITY
Start: 2025-05-09 | End: 1900-01-01

## 2025-05-09 RX ORDER — NEEDLES, DISPOSABLE 25GX5/8"
22G X 1-1/2" NEEDLE, DISPOSABLE MISCELLANEOUS
Qty: 12 | Refills: 1 | Status: ACTIVE | COMMUNITY
Start: 2025-05-09 | End: 1900-01-01

## 2025-05-09 RX ORDER — TESTOSTERONE CYPIONATE 200 MG/ML
200 VIAL (ML) INTRAMUSCULAR
Qty: 4 | Refills: 0 | Status: ACTIVE | COMMUNITY
Start: 2025-05-09 | End: 1900-01-01

## 2025-05-28 ENCOUNTER — APPOINTMENT (OUTPATIENT)
Dept: ENDOCRINOLOGY | Facility: CLINIC | Age: 80
End: 2025-05-28

## 2025-06-05 ENCOUNTER — APPOINTMENT (OUTPATIENT)
Dept: CARDIOLOGY | Facility: CLINIC | Age: 80
End: 2025-06-05
Payer: MEDICARE

## 2025-06-05 PROCEDURE — 93306 TTE W/DOPPLER COMPLETE: CPT

## 2025-06-10 ENCOUNTER — APPOINTMENT (OUTPATIENT)
Dept: ENDOCRINOLOGY | Facility: CLINIC | Age: 80
End: 2025-06-10

## 2025-06-12 ENCOUNTER — APPOINTMENT (OUTPATIENT)
Dept: CARDIOLOGY | Facility: CLINIC | Age: 80
End: 2025-06-12
Payer: MEDICARE

## 2025-06-12 VITALS
HEART RATE: 58 BPM | BODY MASS INDEX: 30.48 KG/M2 | OXYGEN SATURATION: 94 % | HEIGHT: 73 IN | WEIGHT: 230 LBS | DIASTOLIC BLOOD PRESSURE: 58 MMHG | SYSTOLIC BLOOD PRESSURE: 108 MMHG

## 2025-06-12 PROBLEM — T14.8XXA BRUISING: Status: ACTIVE | Noted: 2025-06-12

## 2025-06-12 PROBLEM — I05.0 MITRAL STENOSIS: Status: ACTIVE | Noted: 2025-06-12

## 2025-06-12 PROCEDURE — 99214 OFFICE O/P EST MOD 30 MIN: CPT

## 2025-07-08 ENCOUNTER — RX RENEWAL (OUTPATIENT)
Age: 80
End: 2025-07-08

## 2025-07-10 ENCOUNTER — APPOINTMENT (OUTPATIENT)
Dept: CARDIOLOGY | Facility: CLINIC | Age: 80
End: 2025-07-10

## 2025-07-14 ENCOUNTER — APPOINTMENT (OUTPATIENT)
Dept: ENDOCRINOLOGY | Facility: CLINIC | Age: 80
End: 2025-07-14

## 2025-07-30 LAB
HBA1C MFR BLD HPLC: 9.1
LDLC SERPL DIRECT ASSAY-MCNC: 69
MICROALBUMIN/CREAT 24H UR-RTO: 37
TSH SERPL-ACNC: 2.74

## 2025-07-31 ENCOUNTER — APPOINTMENT (OUTPATIENT)
Dept: ENDOCRINOLOGY | Facility: CLINIC | Age: 80
End: 2025-07-31
Payer: MEDICARE

## 2025-07-31 VITALS
HEIGHT: 73 IN | WEIGHT: 233 LBS | DIASTOLIC BLOOD PRESSURE: 60 MMHG | SYSTOLIC BLOOD PRESSURE: 120 MMHG | BODY MASS INDEX: 30.88 KG/M2 | HEART RATE: 56 BPM | OXYGEN SATURATION: 95 %

## 2025-07-31 DIAGNOSIS — E11.21 TYPE 2 DIABETES MELLITUS WITH DIABETIC NEPHROPATHY: ICD-10-CM

## 2025-07-31 DIAGNOSIS — C61 MALIGNANT NEOPLASM OF PROSTATE: ICD-10-CM

## 2025-07-31 DIAGNOSIS — E29.1 TESTICULAR HYPOFUNCTION: ICD-10-CM

## 2025-07-31 LAB — GLUCOSE BLDC GLUCOMTR-MCNC: 197

## 2025-07-31 PROCEDURE — 82962 GLUCOSE BLOOD TEST: CPT

## 2025-07-31 PROCEDURE — 99214 OFFICE O/P EST MOD 30 MIN: CPT

## 2025-07-31 PROCEDURE — G2211 COMPLEX E/M VISIT ADD ON: CPT

## 2025-07-31 RX ORDER — TIRZEPATIDE 2.5 MG/.5ML
2.5 INJECTION, SOLUTION SUBCUTANEOUS
Qty: 1 | Refills: 1 | Status: ACTIVE | COMMUNITY
Start: 2025-07-31 | End: 1900-01-01

## 2025-08-01 ENCOUNTER — APPOINTMENT (OUTPATIENT)
Dept: CARDIOLOGY | Facility: CLINIC | Age: 80
End: 2025-08-01
Payer: MEDICARE

## 2025-08-01 VITALS
OXYGEN SATURATION: 95 % | WEIGHT: 233 LBS | SYSTOLIC BLOOD PRESSURE: 120 MMHG | HEART RATE: 62 BPM | BODY MASS INDEX: 30.88 KG/M2 | HEIGHT: 73 IN | DIASTOLIC BLOOD PRESSURE: 54 MMHG

## 2025-08-01 DIAGNOSIS — Z95.2 PRESENCE OF PROSTHETIC HEART VALVE: ICD-10-CM

## 2025-08-01 DIAGNOSIS — C61 MALIGNANT NEOPLASM OF PROSTATE: ICD-10-CM

## 2025-08-01 DIAGNOSIS — E11.9 TYPE 2 DIABETES MELLITUS W/OUT COMPLICATIONS: ICD-10-CM

## 2025-08-01 DIAGNOSIS — I50.9 HEART FAILURE, UNSPECIFIED: ICD-10-CM

## 2025-08-01 DIAGNOSIS — Z85.46 PERSONAL HISTORY OF MALIGNANT NEOPLASM OF PROSTATE: ICD-10-CM

## 2025-08-01 DIAGNOSIS — E78.5 HYPERLIPIDEMIA, UNSPECIFIED: ICD-10-CM

## 2025-08-01 DIAGNOSIS — C79.51 MALIGNANT NEOPLASM OF PROSTATE: ICD-10-CM

## 2025-08-01 DIAGNOSIS — I10 ESSENTIAL (PRIMARY) HYPERTENSION: ICD-10-CM

## 2025-08-01 PROCEDURE — 99214 OFFICE O/P EST MOD 30 MIN: CPT

## 2025-08-01 PROCEDURE — 93000 ELECTROCARDIOGRAM COMPLETE: CPT

## 2025-08-03 PROBLEM — C61 PROSTATE CANCER METASTATIC TO BONE: Status: ACTIVE | Noted: 2025-08-03

## 2025-08-03 PROBLEM — Z85.46 HISTORY OF PROSTATE CANCER METASTATIC TO BONE: Status: RESOLVED | Noted: 2025-08-03 | Resolved: 2025-08-03

## 2025-08-04 ENCOUNTER — RX RENEWAL (OUTPATIENT)
Age: 80
End: 2025-08-04

## 2025-08-05 ENCOUNTER — APPOINTMENT (OUTPATIENT)
Dept: DERMATOLOGY | Facility: CLINIC | Age: 80
End: 2025-08-05

## 2025-08-05 RX ORDER — DAPAGLIFLOZIN 10 MG/1
10 TABLET, FILM COATED ORAL DAILY
Qty: 90 | Refills: 3 | Status: ACTIVE | COMMUNITY
Start: 2025-08-05 | End: 1900-01-01

## 2025-08-12 RX ORDER — LEUPROLIDE ACETATE 7.5 MG/.25ML
7.5 INJECTION, SUSPENSION, EXTENDED RELEASE SUBCUTANEOUS
Qty: 1 | Refills: 0 | Status: ACTIVE | COMMUNITY
Start: 2025-08-12

## 2025-09-08 ENCOUNTER — RX RENEWAL (OUTPATIENT)
Age: 80
End: 2025-09-08